# Patient Record
Sex: MALE | Race: WHITE | NOT HISPANIC OR LATINO | Employment: STUDENT | URBAN - METROPOLITAN AREA
[De-identification: names, ages, dates, MRNs, and addresses within clinical notes are randomized per-mention and may not be internally consistent; named-entity substitution may affect disease eponyms.]

---

## 2025-05-20 DIAGNOSIS — M25.521 RIGHT ELBOW PAIN: ICD-10-CM

## 2025-05-21 ENCOUNTER — ANCILLARY PROCEDURE (OUTPATIENT)
Dept: ORTHOPEDIC SURGERY | Facility: CLINIC | Age: 20
End: 2025-05-21
Payer: COMMERCIAL

## 2025-05-21 ENCOUNTER — OFFICE VISIT (OUTPATIENT)
Dept: ORTHOPEDIC SURGERY | Facility: CLINIC | Age: 20
End: 2025-05-21
Payer: COMMERCIAL

## 2025-05-21 ENCOUNTER — PREP FOR PROCEDURE (OUTPATIENT)
Dept: ORTHOPEDIC SURGERY | Facility: CLINIC | Age: 20
End: 2025-05-21

## 2025-05-21 DIAGNOSIS — M25.521 RIGHT ELBOW PAIN: ICD-10-CM

## 2025-05-21 PROCEDURE — 99203 OFFICE O/P NEW LOW 30 MIN: CPT | Performed by: STUDENT IN AN ORGANIZED HEALTH CARE EDUCATION/TRAINING PROGRAM

## 2025-05-21 PROCEDURE — 73080 X-RAY EXAM OF ELBOW: CPT | Mod: RIGHT SIDE | Performed by: STUDENT IN AN ORGANIZED HEALTH CARE EDUCATION/TRAINING PROGRAM

## 2025-05-21 ASSESSMENT — PAIN - FUNCTIONAL ASSESSMENT: PAIN_FUNCTIONAL_ASSESSMENT: 0-10

## 2025-05-21 ASSESSMENT — PAIN SCALES - GENERAL: PAINLEVEL_OUTOF10: 4

## 2025-05-21 ASSESSMENT — PAIN DESCRIPTION - DESCRIPTORS: DESCRIPTORS: ACHING

## 2025-05-21 NOTE — PROGRESS NOTES
PRIMARY CARE PHYSICIAN: Jose Elias Beatty MD  REFERRING PROVIDER: No referring provider defined for this encounter.     CONSULT ORTHOPAEDIC: Elbow Evaluation    ASSESSMENT & PLAN    Impression: 19 y.o. male with ***    Plan:   I explained to the patient the nature of their diagnosis.  I reviewed their imaging studies with them.    Based on the history, physical exam and imaging studies above, the patient's presentation is consistent with consistent with the above diagnosis.  I had a long discussion with the patient regarding their presentation and the treatment options.  We discussed initial nonoperative versus operative management options as well as potential further diagnostic imaging. ***    Follow-Up: Patient will follow-up ***    At the end of the visit, all questions were answered in full. The patient is in agreement with the plan and recommendations. They will call the office with any questions/concerns.    Note dictated with [email or web address] software. Completed without full typed error editing and sent to avoid delay.     SUBJECTIVE  CHIEF COMPLAINT: No chief complaint on file.       HPI: Tony Burleson is a 19 y.o. patient. Tony Burleson complains of ***.     They deny any associated neck pain.  No numbness, tingling, or paresthesias.    REVIEW OF SYSTEMS  Constitutional: See HPI for pain assessment, No significant weight loss, recent trauma  Cardiovascular: No chest pain, shortness of breath  Respiratory: No difficulty breathing, cough  Gastrointestinal: No nausea, vomiting, diarrhea, constipation  Musculoskeletal: Noted in HPI, positive for pain, restricted motion, stiffness  Integumentary: No rashes, easy bruising, redness   Neurological: no numbness or tingling in extremities, no gait disturbances   Psychiatric: No mood changes, memory changes, social issues  Heme/Lymph: no excessive swelling, easy bruising, excessive bleeding  ENT: No hearing changes  Eyes: No vision changes    Medical  "History[1]     Allergies[2]     Surgical History[3]     Family History[4]     Social History     Socioeconomic History    Marital status: Single     Spouse name: Not on file    Number of children: Not on file    Years of education: Not on file    Highest education level: Not on file   Occupational History    Not on file   Tobacco Use    Smoking status: Not on file    Smokeless tobacco: Not on file   Substance and Sexual Activity    Alcohol use: Not on file    Drug use: Not on file    Sexual activity: Not on file   Other Topics Concern    Not on file   Social History Narrative    Not on file     Social Drivers of Health     Financial Resource Strain: Not on file   Food Insecurity: Not on file   Transportation Needs: Not on file   Physical Activity: Not on file   Stress: Not on file   Social Connections: Not on file   Intimate Partner Violence: Not on file   Housing Stability: Not on file        CURRENT MEDICATIONS:   Current Medications[5]     OBJECTIVE    PHYSICAL EXAM      2/1/2021    10:15 AM 3/2/2022    10:26 AM 4/27/2022     9:44 AM 4/27/2022     9:54 AM 5/6/2022    10:08 AM   Vitals   Systolic 145 124 152 147 137   Diastolic 88 80 93 92 81   Heart Rate 54 59 73  76   Temp 36.4 °C (97.6 °F)  36.3 °C (97.4 °F)  36.7 °C (98.1 °F)   Height 1.816 m (5' 11.5\") 1.81 m (5' 11.25\")      Weight (lb) 178.5 188 199  200   BMI 24.55 kg/m2 26.04 kg/m2 27.56 kg/m2  27.7 kg/m2   BSA (m2) 2.02 m2 2.07 m2 2.13 m2  2.14 m2      There is no height or weight on file to calculate BMI.    GENERAL: A/Ox3, NAD. Appears healthy, well nourished  PSYCHIATRIC: Mood stable, appropriate memory recall  EYES: EOM intact, no scleral icterus  CARDIOVASCULAR: Palpable peripheral pulses  LUNGS: Breathing non-labored on room air  SKIN: no erythema, rashes, or ecchymosis     MUSCULOSKELETAL:  Laterality: {right/left/bilat:73313} Elbow Exam  - Negative Spurlings, full painless neck and shoudler ROM  - Skin intact  - No erythema or warmth  - No " ecchymosis or soft tissue swelling  - Elbow ROM:  ***  - Strength:      Flexion ***/5     Extension  ***/5     Pronation/supination ***/5  - Palpation: ***  - Stability: Varus/valgus ***  - Special Tests: ***    NEUROVASCULAR:  - Neurovascular Status: sensation intact to light touch distally, upper extremity motor grossly intact  - Capillary refill brisk at extremities, Bilateral radial pulses 2+    Imaging: Multiple views of the affected {RIGHT/LEFT:60148} elbow(s) demonstrate: ***.   Images were personally reviewed and interpreted by me.  Radiology reports were reviewed by me as well, if readily available at the time.    Dex Roque MD, MPH  Attending Surgeon    Sports Medicine Orthopaedic Surgery  [facility] [employer]        [1]   Past Medical History:  Diagnosis Date    Acute atopic conjunctivitis, unspecified eye 04/19/2017    Allergic conjunctivitis    Cough, unspecified 05/18/2015    Cough    Generalized hyperhidrosis 07/27/2015    Diaphoresis    Headache, unspecified 06/30/2016    Chronic intractable headache, unspecified headache type    Other conditions influencing health status 12/17/2019    History of cough    Other specified health status     No pertinent past medical history    Personal history of other diseases of the respiratory system 09/14/2015    History of streptococcal pharyngitis    Personal history of other diseases of the respiratory system 12/17/2019    History of acute bacterial sinusitis    Personal history of other diseases of the respiratory system 12/08/2013    History of pharyngitis    Personal history of other specified conditions 03/28/2016    History of gynecomastia   [2] Not on File  [3] No past surgical history on file.  [4] No family history on file.  [5]   No current outpatient medications on file.     No current facility-administered medications for this visit.